# Patient Record
Sex: FEMALE | Race: BLACK OR AFRICAN AMERICAN | NOT HISPANIC OR LATINO | Employment: FULL TIME | URBAN - METROPOLITAN AREA
[De-identification: names, ages, dates, MRNs, and addresses within clinical notes are randomized per-mention and may not be internally consistent; named-entity substitution may affect disease eponyms.]

---

## 2023-03-13 ENCOUNTER — SOCIAL WORK (OUTPATIENT)
Dept: ADMINISTRATIVE | Facility: OTHER | Age: 33
End: 2023-03-13

## 2023-03-13 NOTE — PROGRESS NOTES
SW met with pt regarding initial OB assessment. Pt stated this is her 11th pregnancy/2-miscarriage. Pt stated lives alone with her children-14,12,11,9,6,5,4,3 and able to perform ADL's independently. Pt stated does not work. Pt stated support system is her best friends/Qwen/Charae. Pt stated has medicaid([employer]). Pt stated does not have WIC. Pt stated is going to breastfeed. SW provide pt with information on other community resources.NU faxed and scanned pt's notification of pregnancy into epic.  No other needs identified at this time.    Tootie Flowers,MSW  Pager#[phone]

## 2023-03-22 PROBLEM — O09.899 RUBELLA NON-IMMUNE STATUS, ANTEPARTUM: Status: ACTIVE | Noted: 2023-03-22

## 2023-03-22 PROBLEM — Z28.39 RUBELLA NON-IMMUNE STATUS, ANTEPARTUM: Status: ACTIVE | Noted: 2023-03-22

## 2023-03-29 ENCOUNTER — PATIENT MESSAGE (OUTPATIENT)
Dept: ADMINISTRATIVE | Facility: OTHER | Age: 33
End: 2023-03-29

## 2023-05-22 PROBLEM — O16.3 ELEVATED BLOOD PRESSURE AFFECTING PREGNANCY IN THIRD TRIMESTER, ANTEPARTUM: Status: ACTIVE | Noted: 2023-05-22

## 2023-05-22 PROBLEM — Z98.891 HISTORY OF CESAREAN SECTION, UNKNOWN SCAR: Status: ACTIVE | Noted: 2023-05-22

## 2023-05-22 PROBLEM — O41.03X0 OLIGOHYDRAMNIOS IN THIRD TRIMESTER: Status: ACTIVE | Noted: 2023-05-22

## 2023-05-23 PROBLEM — O14.93 PRE-ECLAMPSIA IN THIRD TRIMESTER: Status: ACTIVE | Noted: 2023-05-23

## 2023-05-25 PROBLEM — O14.10 SEVERE PREECLAMPSIA: Status: ACTIVE | Noted: 2023-05-23

## 2023-05-27 PROBLEM — I27.20 PULMONARY HYPERTENSION: Status: ACTIVE | Noted: 2023-05-27

## 2023-07-17 PROBLEM — Z30.42 ENCOUNTER FOR DEPO-PROVERA CONTRACEPTION: Status: ACTIVE | Noted: 2023-07-17

## 2023-08-28 PROBLEM — O41.03X0 OLIGOHYDRAMNIOS IN THIRD TRIMESTER: Status: RESOLVED | Noted: 2023-05-22 | Resolved: 2023-08-28
